# Patient Record
Sex: MALE | Race: BLACK OR AFRICAN AMERICAN | NOT HISPANIC OR LATINO | URBAN - METROPOLITAN AREA
[De-identification: names, ages, dates, MRNs, and addresses within clinical notes are randomized per-mention and may not be internally consistent; named-entity substitution may affect disease eponyms.]

---

## 2017-05-03 ENCOUNTER — OFFICE VISIT - HEALTHEAST (OUTPATIENT)
Dept: INTERNAL MEDICINE | Facility: CLINIC | Age: 27
End: 2017-05-03

## 2017-05-03 DIAGNOSIS — Z00.00 HEALTH MAINTENANCE EXAMINATION: ICD-10-CM

## 2017-05-03 LAB — HIV 1+2 AB+HIV1 P24 AG SERPL QL IA: NEGATIVE

## 2017-05-03 ASSESSMENT — MIFFLIN-ST. JEOR: SCORE: 2036.76

## 2017-05-04 LAB — SYPHILIS RPR SCREEN - HISTORICAL: NORMAL

## 2017-05-05 ENCOUNTER — AMBULATORY - HEALTHEAST (OUTPATIENT)
Dept: INTERNAL MEDICINE | Facility: CLINIC | Age: 27
End: 2017-05-05

## 2017-05-05 ENCOUNTER — AMBULATORY - HEALTHEAST (OUTPATIENT)
Dept: NURSING | Facility: CLINIC | Age: 27
End: 2017-05-05

## 2017-05-05 ENCOUNTER — RECORDS - HEALTHEAST (OUTPATIENT)
Dept: GENERAL RADIOLOGY | Facility: CLINIC | Age: 27
End: 2017-05-05

## 2017-05-05 DIAGNOSIS — R76.11 POSITIVE PPD: ICD-10-CM

## 2017-05-05 DIAGNOSIS — E87.5 HYPERKALEMIA: ICD-10-CM

## 2017-05-05 DIAGNOSIS — Z00.00 HEALTH MAINTENANCE EXAMINATION: ICD-10-CM

## 2017-05-05 DIAGNOSIS — R76.11 NONSPECIFIC REACTION TO TUBERCULIN SKIN TEST WITHOUT ACTIVE TUBERCULOSIS: ICD-10-CM

## 2017-05-05 LAB
CHOLEST SERPL-MCNC: 140 MG/DL
FASTING STATUS PATIENT QL REPORTED: ABNORMAL
HDLC SERPL-MCNC: 38 MG/DL
LDLC SERPL CALC-MCNC: 88 MG/DL
TRIGL SERPL-MCNC: 69 MG/DL

## 2017-10-31 ENCOUNTER — COMMUNICATION - HEALTHEAST (OUTPATIENT)
Dept: INTERNAL MEDICINE | Facility: CLINIC | Age: 27
End: 2017-10-31

## 2021-05-30 VITALS — WEIGHT: 233.8 LBS | HEIGHT: 70 IN | BODY MASS INDEX: 33.47 KG/M2

## 2021-06-10 NOTE — PROGRESS NOTES
ASSESSMENT:  1. Health maintenance examination  Mike Estevez is a healthy 26-year-old man who presents for annual physical.  We will check STD's testing by request, check fasting labs at a later date, complete PPD skin test with recheck in 48-72 hours, which is required by his new employer.  Overall, he feels well, is participating in boxing exercises.  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  - TB Skin Test  - Lipid Cascade; Future  - Glucose; Future  - HIV Antigen/Antibody Screening [place]  - Syphilis Screen, Cascade  - Chlamydia trachomatis & Neisseria gonorrhoeae, Amplified Detection      PLAN:  Patient Instructions   Check fasting labs (cholesterol and glucose) after 8 hour fast    Check STD test today           Orders Placed This Encounter   Procedures     Chlamydia trachomatis & Neisseria gonorrhoeae, Amplified Detection     Order Specific Question:   Specimen Source?     Answer:   Urine     Lipid Cascade     Standing Status:   Future     Standing Expiration Date:   5/3/2018     Order Specific Question:   Fasting is required?     Answer:   Yes     Glucose     Standing Status:   Future     Standing Expiration Date:   5/3/2018     HIV Antigen/Antibody Screening [place]     Syphilis Screen, Cascade     TB Skin Test     There are no discontinued medications.    No Follow-up on file.    CHIEF COMPLAINT:  Chief Complaint   Patient presents with     Annual Exam       HISTORY OF PRESENT ILLNESS:  Mike is a 26 y.o. male presenting to the clinic today for his annual exam. He is doing well and feeling great.     REVIEW OF SYSTEMS:   He recently had a false positive for mono. He has not been in any new relationships. He has no vision or hearing issues. He is currently enrolled in Boxing training but does not intend to actually box. He denies chest pain or difficulty breathing. He has no stomach or bowel issues. All other systems are negative.    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 He graduated from [employer]. He is no longer playing football. Reviewed as below.    History   Smoking Status     Never Smoker   Smokeless Tobacco     Not on file       Family History   Problem Relation Age of Onset     Cancer Mother      Cervical     Colon cancer Neg Hx      Heart disease Neg Hx        Social History     Social History     Marital status: Single     Spouse name: N/A     Number of children: N/A     Years of education: N/A     Occupational History     Not on file.     Social History Main Topics     Smoking status: Never Smoker     Smokeless tobacco: Not on file     Alcohol use Not on file     Drug use: Not on file     Sexual activity: Not on file     Other Topics Concern     Not on file     Social History Narrative       Past Surgical History:   Procedure Laterality Date     KNEE SURGERY         No Known Allergies    Active Ambulatory Problems     Diagnosis Date Noted     Joint Pain, Localized In The Knee      Joint Pain Fingers      Verruca plana 07/14/2015     Resolved Ambulatory Problems     Diagnosis Date Noted     Acute Medial Meniscus Tear      No Additional Past Medical History       VITALS:  Vitals:    05/03/17 1348   BP: 110/60   Pulse: 66   Weight: (!) 233 lb 12.8 oz (106.1 kg)   Height: 5' 10\" (1.778 m)     Wt Readings from Last 3 Encounters:   05/03/17 (!) 233 lb 12.8 oz (106.1 kg)   12/09/15 (!) 227 lb (103 kg)   10/07/15 (!) 226 lb (102.5 kg)     Body mass index is 33.55 kg/(m^2).    PHYSICAL EXAM:  General: Alert, pleasant, appears fit  Oropharynx: Good dentition.   Neck: No lymphadenopathy.  Lungs: Clear to auscultation  Heart: Regular rate and rhythm. No murmur.  Abdomen: No spleen enlargement.  Extremities: No ankle edema.     ADDITIONAL HISTORY SUMMARIZED (2): Reviewed note from 12/9/15 regarding patient history.  DECISION TO OBTAIN EXTRA INFORMATION (1): None.   RADIOLOGY TESTS (1): None.  LABS (1): " Ordered labs.  MEDICINE TESTS (1): None.  INDEPENDENT REVIEW (2 each): None.     The visit lasted a total of 12 minutes face to face with the patient. Over 50% of the time was spent counseling and educating the patient about health maintenance.    IAnirudh, am scribing for and in the presence of, Dr. Boykin.    I, Dr. Boykin, personally performed the services described in this documentation, as scribed by Anirudh Singh in my presence, and it is both accurate and complete.    MEDICATIONS:  No current outpatient prescriptions on file.     No current facility-administered medications for this visit.        Total data points:3

## 2021-06-15 PROBLEM — Z22.7 LATENT TUBERCULOSIS BY BLOOD TEST: Status: ACTIVE | Noted: 2017-05-09
